# Patient Record
Sex: FEMALE | Race: WHITE | NOT HISPANIC OR LATINO | Employment: FULL TIME | URBAN - METROPOLITAN AREA
[De-identification: names, ages, dates, MRNs, and addresses within clinical notes are randomized per-mention and may not be internally consistent; named-entity substitution may affect disease eponyms.]

---

## 2024-02-28 ENCOUNTER — HOSPITAL ENCOUNTER (EMERGENCY)
Facility: CLINIC | Age: 44
Discharge: HOME | End: 2024-02-28
Attending: STUDENT IN AN ORGANIZED HEALTH CARE EDUCATION/TRAINING PROGRAM | Admitting: STUDENT IN AN ORGANIZED HEALTH CARE EDUCATION/TRAINING PROGRAM
Payer: COMMERCIAL

## 2024-02-28 ENCOUNTER — APPOINTMENT (OUTPATIENT)
Dept: GENERAL RADIOLOGY | Facility: CLINIC | Age: 44
End: 2024-02-28
Attending: STUDENT IN AN ORGANIZED HEALTH CARE EDUCATION/TRAINING PROGRAM
Payer: COMMERCIAL

## 2024-02-28 VITALS
RESPIRATION RATE: 16 BRPM | WEIGHT: 293 LBS | TEMPERATURE: 98 F | HEIGHT: 67 IN | DIASTOLIC BLOOD PRESSURE: 82 MMHG | HEART RATE: 89 BPM | BODY MASS INDEX: 45.99 KG/M2 | OXYGEN SATURATION: 98 % | SYSTOLIC BLOOD PRESSURE: 149 MMHG

## 2024-02-28 DIAGNOSIS — J32.9 SINUSITIS, UNSPECIFIED CHRONICITY, UNSPECIFIED LOCATION: ICD-10-CM

## 2024-02-28 DIAGNOSIS — R04.0 EPISTAXIS: ICD-10-CM

## 2024-02-28 PROCEDURE — 99284 EMERGENCY DEPT VISIT MOD MDM: CPT | Mod: 25

## 2024-02-28 PROCEDURE — 71046 X-RAY EXAM CHEST 2 VIEWS: CPT

## 2024-02-28 RX ORDER — BENZONATATE 100 MG/1
100-200 CAPSULE ORAL 3 TIMES DAILY PRN
Qty: 20 CAPSULE | Refills: 0 | Status: SHIPPED | OUTPATIENT
Start: 2024-02-28

## 2024-02-28 ASSESSMENT — ACTIVITIES OF DAILY LIVING (ADL)
ADLS_ACUITY_SCORE: 35
ADLS_ACUITY_SCORE: 35

## 2024-02-29 NOTE — ED PROVIDER NOTES
"  History     Chief Complaint:  Epistaxis     HPI   Eileen Garsia is a 43 year old female who presents for evaluation of epistaxis. The patient reports that she has had two separate episodes of bleeding from her left nare today. States that each episode lasted about 20 minutes and she has had some gauze in her left nare for about two hours. Notes that she is tasting blood in her throat and has been spitting up blood. Adds that there was blood also coming out of her right nare from the left side. Of note, the patient has been sick with sinus pain, cough, and congestion for about five days and has been blowing her nose frequently. Reports that she also just flew back from [place] yesterday. Denies history of asthma. Denies fever, sore throat, nausea, vomiting, diarrhea, falls, or trauma to her nose. Denies use of blood thinners, anticoagulants, or history of bleeding disorders. Denies chest pain or shortness of breath.   Patient reports no history of bleeding with dental procedures, bleeding with brushing teeth, or other bleeding. Periods have been heavy and steady her whole life.     Independent Historian:   None - Patient Only    Review of External Notes:   I reviewed the patient's urgent care note from November 2022 when she was seen for sinusitis.  Prescribed albuterol, Tessalon Perles, steroid burst, and Augmentin.    Medications:    Propranolol  Topiramate  Loratadine  Mometasone   Prednisone     Past Medical History:    Ovarian cyst  Cervicalgia   Migraine   BRADY  Obesity  Vitamin D deficiency     Past Surgical History:    Grottoes teeth removal     Physical Exam   Patient Vitals for the past 24 hrs:   BP Temp Temp src Pulse Resp SpO2 Height Weight   02/28/24 1813 (!) 168/100 98  F (36.7  C) Oral 99 16 99 % 1.702 m (5' 7\") 136.1 kg (300 lb)      Physical Exam  Vital signs and nursing notes reviewed.    General: Alert and oriented. No acute distress. Nontoxic appearance.  Head: No signs of trauma. "   Mouth/Throat: Oropharynx moist.   Eyes: Conjunctivae are normal. Pupils are equal.  Neck: Normal range of motion.    CV: Appears well perfused.  Resp: Normal effort of breathing. No respiratory distress.   MSK: Normal range of motion. No obvious deformity.   Neuro: The patient is alert and interactive. Speech normal. GCS 15  Skin: No lesions or sign of trauma noted.   Psych: Normal mood and affect, and behavior.    Emergency Department Course   Procedures     Epistaxis Care     Procedure: Epistaxis Care    Indication: Epistaxis    Consent: Verbal    Medication: Patient was topically medicated with Oxymetazoline    Procedure detail:   Patient was closely monitored and did not have evidence of recurrent bleeding.     Patient Status: The patient tolerated the procedure well: Yes. There were no complications.    Emergency Department Course & Assessments:    Interventions:  Medications - No data to display     Independent Interpretation (X-rays, CTs, rhythm strip):  None    Assessments/Consultations/Discussion of Management or Tests:   ED Course as of 02/28/24 1856 Wed Feb 28, 2024 1824 I initially assessed the patient and obtained the above history and physical exam.     1829 I reassessed the patient and updated them on results and plan of care.    1829 Performed epistaxis care     Social Determinants of Health affecting care:   None    Disposition:  The patient was discharged.     Impression & Plan    Medical Decision Making:  Eileen Garsia is a 43 year old female who presents for evaluation of epistaxis. Also reporting URI symptoms of congestion, phlegm, cough. See HPI. Mildly hypertensive but remainder of vital signs normal. On exam, patient's nose bleed had stopped. There was no clear source of bleeding on nasal speculum exam.   There are no signs of coagulopathy causing the bleeding or a general medical condition causing the bleeding today. Through shared decision making, we decided against lab work  today. Suspect nose bleeding today is likely due to frequent nose bleeding in the setting of URI, recent air travel, and time spent in dry air of [place].  The bleeding stopped with time/pressure/and Afrin and did not restart here in ED, therefore no nasal packing is indicated.   Additionally, patient has been dealing with URI for 1 week.  Lungs had some coarse breath sounds but fortunately, XR negative for pneumonia, effusion, or infiltrate. Bilateral Tms without erythema or bulging and posterior oropharynx is clear. There are no signs of meningitis, sepsis, shock, or other emergent bacterial infections. Given her green sputum, worsening sinus symptoms, and epistaxis, will initiate treatment with antibiotics for sinusitis. Also will send Tessalon and discussed with patient to use humidity and vaseline or bacitracin in nares bid for the next week.   Supportive outpatient management is indicated.  Close follow-up with primary care and ENT if needed.  Patient agreeable and had questions answered.    Diagnosis:    ICD-10-CM    1. Epistaxis  R04.0       2. Sinusitis, unspecified chronicity, unspecified location  J32.9            Discharge Medications:  Discharge Medication List as of 2/28/2024  8:00 PM        START taking these medications    Details   amoxicillin-clavulanate (AUGMENTIN) 875-125 MG tablet Take 1 tablet by mouth 2 times daily for 7 days, Disp-14 tablet, R-0, E-Prescribe      benzonatate (TESSALON) 100 MG capsule Take 1-2 capsules (100-200 mg) by mouth 3 times daily as needed for cough, Disp-20 capsule, R-0, E-Prescribe            Scribe Disclosure:  ISommer, am serving as a scribe at 6:17 PM on 2/28/2024 to document services personally performed by Caridad Jose PA-C based on my observations and the provider's statements to me.     2/28/2024   Caridad Jose PA-C Victoria J. ZEB Jose on 2/28/2024 at 8:11 PM         Caridad Jose PA-C  02/28/24 2011

## 2024-02-29 NOTE — DISCHARGE INSTRUCTIONS
Take antibiotics as prescribed.  You may also use Afrin for no more than 3 days in a row to help with nasal congestion and stop nosebleeds as we discussed.  Also recommend Flonase nasal spray, sleeping with a humidifier in your room, and doing saline rinses.  Avoid blowing your nose hard, using a straw, or other activities that irritate your nasal cavity.  Follow-up with ENT as needed and return to the ED should you develop persistent bleeding, high fevers, difficulty swallowing or breathing, or any further concerns.  I hope you feel better soon!  Discharge Instructions  Upper Respiratory Infection    The upper respiratory tract includes the sinuses, nasal passages, pharynx, and larynx. A URI, or upper respiratory infection, is an infection of any of the parts of the upper airway. Symptoms include runny nose, congestion, sneezing, sore throat, cough, and fever. URIs are almost always caused by a virus. Antibiotics do not help with viral infections, so are generally not prescribed. A URI is very contagious through coughing and nasal secretions; make sure you wash your hands often and clean surfaces after sneezing, coughing or touching them. While you should start to improve in 3 - 5 days, remember that sometimes a cough can linger for several weeks.    Generally, every Emergency Department visit should have a follow-up clinic visit with either a primary or a specialty clinic/provider. Please follow-up as instructed by your emergency provider today.    Return to the Emergency Department if:  Any of your symptoms get much worse.  You seem very sick, like being too weak to get up.  You have chest pain or shortness of breath.   You have a severe headache.  You are vomiting (throwing up) so much you cannot keep fluids or medicines down.  You have confusion or seem unusually drowsy.  You have a seizure.    What can I do to help myself?  Fill any prescriptions the provider gave you and take them right away  If you have a  fever, get plenty of rest and drink lots of fluids, especially water.  Using a humidifier or saline nose spray will also help loosen mucous.   Clothes or blankets will not change your fever. Do what is comfortable for you.  Bathing or sponging in lukewarm water may help you feel better.  Acetaminophen (Tylenol ) or ibuprofen (Advil , Motrin ) will help bring fever down and may help you feel more comfortable. Be sure to read and follow the package directions, and ask your provider if you have questions.  Do not drink alcohol.  Decongestants may help you feel better. You may use decongestant nose sprays Afrin  (oxymetazoline) or Xavier-Synephrine  (phenylephrine hydrochloride) for up to 3 days, or may use a decongestant tablet like Sudafed  (pseudoephedrine).  If you were given a prescription for medicine here today, be sure to read all of the information (including the package insert) that comes with your prescription.  This will include important information about the medicine, its side effects, and any warnings that you need to know about.  The pharmacist who fills the prescription can provide more information and answer questions you may have about the medicine.  If you have questions or concerns that the pharmacist cannot address, please call or return to the Emergency Department.   Remember that you can always come back to the Emergency Department if you are not able to see your regular provider in the amount of time listed above, if you get any new symptoms, or if there is anything that worries you.

## 2024-02-29 NOTE — ED TRIAGE NOTES
Pt has had a couple of nosebleeds today that lasted about 20 minutes the bleeding is controlled but she is concerned